# Patient Record
(demographics unavailable — no encounter records)

---

## 2024-12-11 NOTE — HISTORY OF PRESENT ILLNESS
[FreeTextEntry1] : Reason For Visit---chronic stable HIV and chronic stable Polycythemia vera JUDY HILL is a pleasant 36 year old male her for a follow-up visit. He is legally blind and uses walking stick. Extremely independent. Pt has stopped drinking 2 years ago, He is still smoking [employer] cigarettes 1/2 pack a day and some pot and not planning on quitting. Pt not interested in seeing liver clinic. Pt has new hearing aids and hears well. He is being followed by hematology. New anterior wall stemi/anterior wall MI. Stent was placed in mid LED, 90% blockage. Needs to see Dr. Fareed Gacria in March. Pt sees Nito Jarrett Cardiologist in [place], seen 2/11/2020 and recently seen by cardiologist Dr. Jordan Serrano here at [place].. He states his sleeping patterns are ok at the moment . Has both doses Modern vaccine, recommends booster. and will get covid vaccine and flu vaccine today 12/11/2024   Plan --chronic stable HIV and chronic stable Polycythemia vera 1) continue Biktarvy -25mg oral tablet daily for HIV management( reviewed and renewed) 2) needs social work today 3) reviewed all previous labs with patient and ordered new labs including cbc, bmp, tsh, cd4, viral load, urine , psa, tsh 4) see in 4 months in person 5) needs follow up with both cardiology and Hemon[facility] ( pt ststes he will call both providers offices next week for follow up appointments ) 6) external provider notes reviewed. 7) pt agreed to get covid vaccine and flu vaccine today 12/11/2024    medications as of 2/5/20  mwtoprolol succinate 25mg daily  pantoprazole one tab at night  plavix 75mg daily  pravastatin 10mg daily  vitamin D3 2000ASA 81mg  Biktarvy daily  lisinopril 2.5 daily.    History of Present Illness  Reason For Visit  JUDY HILL is a 30 year old male being seen for a follow-up visit. Pt recently went to [facility] and getting fitted for hearing aids this friday 11/10/2018. Need to go back hematolgy...make appt. need chext x ray heavy smoker, need derm appt and handicap sticker. See pt in one month. Started Semtuza this week stopped truvada, norvir and reyataz. labs today. I also asked the patient that his father check him for eash when he starts the new medication and the patient states stated that he would since he is blind and cannot tell if he is having a skin reaction other than puritis.    History of Present Illness  Reason For Visit  JUDY HILL is a 30 year old male being seen for a follow-up visit. Had genosure redrawn. pt used to work in [employer] and worked in [employer]. Needs Vesid. see in 2 months. Has polycythemia Vera as had hemeoncology appt this monday 9/10/18. father states he is taking him. restart taking baby aspirin 81mg daily. Pt is also a heavy drinker drinking 7 beers a day and smoking cigarettes as well.    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    Pt last seen in clinic , has been adherent with his ART. Is sexually active with partners but uses protection regularly. Is still smoking cigarettes reguarly and drinking beer everyday. Is not currently interested in quitting either at this time.      Active Problems  Accessory skin tags (757.39) (Q82.8)  Acid reflux (530.81) (K21.9)  Bilateral hearing loss (389.9) (H91.93)  Cigarette smoker (305.1) (F17.210)  Deep vein thrombosis of lower extremity (453.40) (I82.409)  Dental caries (521.00) (K02.9)  Frequent headaches (784.0) (R51.9)  Hearing loss (389.9) (H91.90)  HIV disease (042) (B20)  Insomnia (780.52) (G47.00)  Leukocytosis (288.60) (D72.829)  Multiple melanocytic nevi (216.9) (D22.9)  Myocardial infarction (410.90) (I21.9)  Polycythemia vera (238.4) (D45)  Retinal detachment (361.9) (H33.20)  Screening exam for skin cancer (V76.43) (Z12.83)  Sensorineural hearing loss (SNHL) of both ears (389.18) (H90.3)  Total Vision Loss Binocular  Vitamin D deficiency (268.9) (E55.9)    Past Medical History  Deep vein thrombosis of lower extremity (453.40) (I82.409)  Hearing loss (389.9) (H91.90)  History of Hepatitis B immune (V49.89) (Z78.9)  HIV disease (042) (B20)  Insomnia (780.52) (G47.00)  Polycythemia vera (238.4) (D45)  Retinal detachment (361.9) (H33.20)  Total Vision Loss Binocular  History of Visit for dental examination (V72.2) (Z01.20)  History of Visit for eye and vision exam (V72.0) (Z01.00)    Current Meds  Aspirin EC Low Dose 81 MG Oral Tablet Delayed Release; TAKE 1 TABLET BY MOUTH  EVERY DAY  Biktarvy -25 MG Oral Tablet; TAKE ONE TABLET BY MOUTH DAILY  busPIRone HCl - 15 MG Oral Tablet; take 1 tablet po bid  Clopidogrel Bisulfate 75 MG Oral Tablet  Famotidine 10 MG TABS; TAKE 1 TABLET AT BEDTIME  Lisinopril 2.5 MG Oral Tablet  Metoprolol Succinate ER 50 MG Oral Tablet Extended Release 24 Hour; TAKE TABLET  Daily  Pravastatin Sodium 10 MG Oral Tablet  Vitamin D3 50 MCG (2000 UT) Oral Capsule; TAKE 1 CAPSULE Daily    Allergies  No Known Allergies

## 2024-12-11 NOTE — ASSESSMENT
[FreeTextEntry1] : Plan --chronic stable HIV and chronic stable Polycythemia vera 1) continue Biktarvy -25mg oral tablet daily for HIV management( reviewed and renewed) 2) needs social work today 3) reviewed all previous labs with patient and ordered new labs including cbc, bmp, tsh, cd4, viral load, urine , psa, tsh 4) see in 4 months in person 5) needs follow up with both cardiology and Hemon[facility] ( pt ststes he will call both providers offices next week for follow up appointments ) 6) external provider notes reviewed. 7) pt agreed to get covid vaccine and flu vaccine today 12/11/2024

## 2025-06-13 NOTE — ASSESSMENT
[FreeTextEntry1] : Plan --chronic stable HIV and chronic stable Polycythemia vera 1) continue Biktarvy -25mg oral tablet daily for HIV management ( reviewed and renewed) 2) needs social work today 3) reviewed all previous labs with patient and ordered new labs including cbc, bmp, tsh, cd4, viral load, urine , psa, tsh 4) see in 4 months in person 5) needs follow up with both cardiology and Hemon[facility] ( pt states he will call both providers offices next week for follow up appointments ) 6) external provider notes reviewed.  [Treatment Education] : treatment education [Treatment Adherence] : treatment adherence [Drug Interactions / Side Effects] : drug interactions/side effects [HIV Education] : HIV Education [Anticipatory Guidance] : anticipatory guidance

## 2025-06-13 NOTE — HISTORY OF PRESENT ILLNESS
[FreeTextEntry1] : Reason For Visit---chronic stable HIV and chronic stable Polycythemia vera JUDY HILL is a pleasant 36 year old male her for a follow-up visit. He is legally blind and uses walking stick. Extremely independent. Pt has stopped drinking 2 years ago, He is still smoking [employer] cigarettes 1/2 pack a day and some pot and not planning on quitting. Pt not interested in seeing liver clinic. Pt has new hearing aids and hears well. He is being followed by hematology. New anterior wall stemi/anterior wall MI. Stent was placed in mid LED, 90% blockage. Needs to see Dr. Fareed Garcia in March. Pt sees Nito Jarrett Cardiologist in [place], seen 2/11/2020 and recently seen by cardiologist Dr. Jordan Serrano here at [place].. He states his sleeping patterns are ok at the moment . Has both doses Modern vaccine, recommends booster. and will get covid vaccine and flu vaccine today 12/11/2024   Plan --chronic stable HIV and chronic stable Polycythemia vera 1) continue Biktarvy -25mg oral tablet daily for HIV management ( reviewed and renewed) 2) needs social work today 3) reviewed all previous labs with patient and ordered new labs including cbc, bmp, tsh, cd4, viral load, urine , psa, tsh 4) see in 4 months in person 5) needs follow up with both cardiology and Hemon[facility] ( pt states he will call both providers offices next week for follow up appointments ) 6) external provider notes reviewed.     medications as of 2/5/20  mwtoprolol succinate 25mg daily  pantoprazole one tab at night  plavix 75mg daily  pravastatin 10mg daily  vitamin D3 2000ASA 81mg  Biktarvy daily  lisinopril 2.5 daily.    History of Present Illness  Reason For Visit  JUDY HILL is a 30 year old male being seen for a follow-up visit. Pt recently went to [facility] and getting fitted for hearing aids this friday 11/10/2018. Need to go back hematolgy...make appt. need chext x ray heavy smoker, need derm appt and handicap sticker. See pt in one month. Started Semtuza this week stopped truvada, norvir and reyataz. labs today. I also asked the patient that his father check him for eash when he starts the new medication and the patient states stated that he would since he is blind and cannot tell if he is having a skin reaction other than puritis.    History of Present Illness  Reason For Visit  JUDY HILL is a 30 year old male being seen for a follow-up visit. Had genosure redrawn. pt used to work in [place] and worked in [employer]. Needs Vesid. see in 2 months. Has polycythemia Vera as had hemeoncology appt this monday 9/10/18. father states he is taking him. restart taking baby aspirin 81mg daily. Pt is also a heavy drinker drinking 7 beers a day and smoking cigarettes as well.    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    Pt last seen in clinic , has been adherent with his ART. Is sexually active with partners but uses protection regularly. Is still smoking cigarettes reguarly and drinking beer everyday. Is not currently interested in quitting either at this time.      Active Problems  Accessory skin tags (757.39) (Q82.8)  Acid reflux (530.81) (K21.9)  Bilateral hearing loss (389.9) (H91.93)  Cigarette smoker (305.1) (F17.210)  Deep vein thrombosis of lower extremity (453.40) (I82.409)  Dental caries (521.00) (K02.9)  Frequent headaches (784.0) (R51.9)  Hearing loss (389.9) (H91.90)  HIV disease (042) (B20)  Insomnia (780.52) (G47.00)  Leukocytosis (288.60) (D72.829)  Multiple melanocytic nevi (216.9) (D22.9)  Myocardial infarction (410.90) (I21.9)  Polycythemia vera (238.4) (D45)  Retinal detachment (361.9) (H33.20)  Screening exam for skin cancer (V76.43) (Z12.83)  Sensorineural hearing loss (SNHL) of both ears (389.18) (H90.3)  Total Vision Loss Binocular  Vitamin D deficiency (268.9) (E55.9)    Past Medical History  Deep vein thrombosis of lower extremity (453.40) (I82.409)  Hearing loss (389.9) (H91.90)  History of Hepatitis B immune (V49.89) (Z78.9)  HIV disease (042) (B20)  Insomnia (780.52) (G47.00)  Polycythemia vera (238.4) (D45)  Retinal detachment (361.9) (H33.20)  Total Vision Loss Binocular  History of Visit for dental examination (V72.2) (Z01.20)  History of Visit for eye and vision exam (V72.0) (Z01.00)    Current Meds  Aspirin EC Low Dose 81 MG Oral Tablet Delayed Release; TAKE 1 TABLET BY MOUTH  EVERY DAY  Biktarvy -25 MG Oral Tablet; TAKE ONE TABLET BY MOUTH DAILY  busPIRone HCl - 15 MG Oral Tablet; take 1 tablet po bid  Clopidogrel Bisulfate 75 MG Oral Tablet  Famotidine 10 MG TABS; TAKE 1 TABLET AT BEDTIME  Lisinopril 2.5 MG Oral Tablet  Metoprolol Succinate ER 50 MG Oral Tablet Extended Release 24 Hour; TAKE TABLET  Daily  Pravastatin Sodium 10 MG Oral Tablet  Vitamin D3 50 MCG (2000 UT) Oral Capsule; TAKE 1 CAPSULE Daily    Allergies  No Known Allergies